# Patient Record
(demographics unavailable — no encounter records)

---

## 2025-03-25 NOTE — DISCUSSION/SUMMARY
[de-identified] : Patient I talked about how this area is generally muscular and is probably could consistent with a slight Strain despite the fact that it is been hurting for many months this is not unusual for calf strains.  At this point we simply placed patient on Celebrex 20 mg p.o. twice daily for 6-day course then to be taken thereafter as needed.  The reasonable risk benefits of medication were discussed in detail include potential side effects.  We also reviewed the patient current medication profile it appears to be no relative current contraindication to his limited use.  Patient will follow-up in 2 weeks time if not thoroughly improved at that point we may need to employ further imaging options.  This consultation was 30 minutes exclusive teaching time and any separately billed procedures.

## 2025-03-25 NOTE — HISTORY OF PRESENT ILLNESS
[de-identified] : First-time visit for this 74-year-old gentleman he is here with a 6-month history of right calf pain.  Patient recalls no specific accident injury but he notices pain and a cramping sensation sometimes at night but also some discomfort while walking and prolonged standing.  Patient takes no medication for this he is otherwise relatively healthy.

## 2025-03-25 NOTE — PHYSICAL EXAM
[de-identified] : Right calf has some mild tenderness palpation of the mid substance of calf musculature.  Achilles mechanism is intact.  No undue swelling no undue warmth.  Negative Homans' sign.